# Patient Record
Sex: MALE | Race: WHITE | NOT HISPANIC OR LATINO | URBAN - METROPOLITAN AREA
[De-identification: names, ages, dates, MRNs, and addresses within clinical notes are randomized per-mention and may not be internally consistent; named-entity substitution may affect disease eponyms.]

---

## 2024-06-08 ENCOUNTER — OFFICE VISIT (OUTPATIENT)
Dept: URGENT CARE | Facility: CLINIC | Age: 19
End: 2024-06-08
Payer: COMMERCIAL

## 2024-06-08 VITALS
WEIGHT: 165 LBS | TEMPERATURE: 96.5 F | OXYGEN SATURATION: 98 % | SYSTOLIC BLOOD PRESSURE: 122 MMHG | DIASTOLIC BLOOD PRESSURE: 88 MMHG | HEART RATE: 82 BPM | BODY MASS INDEX: 23.62 KG/M2 | RESPIRATION RATE: 16 BRPM | HEIGHT: 70 IN

## 2024-06-08 DIAGNOSIS — J02.9 SORE THROAT: Primary | ICD-10-CM

## 2024-06-08 LAB
POC RAPID STREP: NEGATIVE
S PYO DNA THROAT QL NAA+PROBE: NOT DETECTED

## 2024-06-08 PROCEDURE — 87102 FUNGUS ISOLATION CULTURE: CPT

## 2024-06-08 PROCEDURE — 87880 STREP A ASSAY W/OPTIC: CPT | Performed by: PHYSICIAN ASSISTANT

## 2024-06-08 PROCEDURE — 1036F TOBACCO NON-USER: CPT | Performed by: PHYSICIAN ASSISTANT

## 2024-06-08 PROCEDURE — 87651 STREP A DNA AMP PROBE: CPT

## 2024-06-08 PROCEDURE — 99204 OFFICE O/P NEW MOD 45 MIN: CPT | Performed by: PHYSICIAN ASSISTANT

## 2024-06-08 RX ADMIN — Medication 10 MG: at 15:04

## 2024-06-08 ASSESSMENT — PAIN SCALES - GENERAL: PAINLEVEL: 5

## 2024-06-08 NOTE — LETTER
June 8, 2024     Patient: Marcos Mike   YOB: 2005   Date of Visit: 6/8/2024       To Whom It May Concern:    It is my medical opinion that Marcos Mike may return to work on 06/09/2024 .    If you have any questions or concerns, please don't hesitate to call.         Sincerely,        Jani Flowers PA-C    CC: No Recipients

## 2024-06-08 NOTE — PROGRESS NOTES
"Subjective   Patient ID: Marcos Mike is a 18 y.o. male who presents for Sore Throat (ST, bilateral ear pain, fever 101 since Monday 6/3).  Patient is here with complaints of sore throat bilateral ear pain ongoing for the last 5 days.  Notes fevers on days 1 and 2 but the fever has resolved.  He denies any nausea or vomiting diarrhea or abdominal pain no myalgias.  The sore throat has not improved at all despite use of ibuprofen.  He otherwise notes that he is generally healthy and at time of exam is afebrile and nontoxic in appearance.  He denies any significant nasal congestion denies any significant cough    No past medical history on file.      The remainder of the systems were reviewed and are negative unless noted above      Objective   /88   Pulse 82   Temp 35.8 °C (96.5 °F)   Resp 16   Ht 1.778 m (5' 10\")   Wt 74.8 kg (165 lb)   SpO2 98%   BMI 23.68 kg/m²   Physical Exam  Vitals reviewed.   Constitutional:       General: He is not in acute distress.     Appearance: Normal appearance. He is not toxic-appearing.   HENT:      Head: Normocephalic.      Right Ear: Tympanic membrane and ear canal normal. No tenderness. No mastoid tenderness.      Left Ear: Tympanic membrane and ear canal normal. No tenderness. No mastoid tenderness.      Nose: No congestion or rhinorrhea.      Mouth/Throat:      Mouth: Mucous membranes are moist.      Pharynx: Oropharynx is clear. Posterior oropharyngeal erythema present.      Comments: Thick white coating to the tongue.  Otherwise no evidence of peritonsillar abscess or tonsillar hypertrophy  Eyes:      Conjunctiva/sclera: Conjunctivae normal.   Cardiovascular:      Rate and Rhythm: Normal rate and regular rhythm.      Heart sounds: No murmur heard.  Pulmonary:      Effort: No respiratory distress.      Breath sounds: No stridor. No wheezing, rhonchi or rales.   Chest:      Chest wall: No tenderness.   Abdominal:      Tenderness: There is no abdominal tenderness. " There is no guarding.   Musculoskeletal:         General: Normal range of motion.      Cervical back: No rigidity.   Lymphadenopathy:      Cervical: Cervical adenopathy present.   Skin:     General: Skin is warm and dry.      Findings: No erythema.   Neurological:      General: No focal deficit present.      Mental Status: He is alert.         Assessment/Plan   Problem List Items Addressed This Visit       Sore throat - Primary    Relevant Medications    dexAMETHasone (Decadron) 10 mg/mL oral liquid 10 mg (Start on 6/8/2024  2:30 PM)    Other Relevant Orders    POCT rapid strep A manually resulted (Completed)    Group A Streptococcus, PCR    Fungal Culture/Smear   Rapid strep is negative.  Notable erythema of the posterior oropharynx and a thick white coating on the tongue.  Sending swab for fungal smear as well as strep PCR testing to rule strep out.  Patient treated with single dose of Decadron here in office.  Recommending continued use of ibuprofen at home.  Salt water gargles, sore throat lozenges

## 2024-06-11 LAB
FUNGUS SPEC CULT: ABNORMAL
FUNGUS SPEC FUNGUS STN: ABNORMAL

## 2025-04-21 ENCOUNTER — HOSPITAL ENCOUNTER (EMERGENCY)
Facility: HOSPITAL | Age: 20
Discharge: HOME | End: 2025-04-21
Payer: COMMERCIAL

## 2025-04-21 VITALS
TEMPERATURE: 98.6 F | OXYGEN SATURATION: 97 % | BODY MASS INDEX: 23.62 KG/M2 | HEIGHT: 70 IN | RESPIRATION RATE: 16 BRPM | WEIGHT: 165 LBS | HEART RATE: 84 BPM | SYSTOLIC BLOOD PRESSURE: 143 MMHG | DIASTOLIC BLOOD PRESSURE: 72 MMHG

## 2025-04-21 DIAGNOSIS — T25.122A SUPERFICIAL BURN OF LEFT FOOT, INITIAL ENCOUNTER: ICD-10-CM

## 2025-04-21 DIAGNOSIS — T25.222A PARTIAL THICKNESS BURN OF LEFT FOOT, INITIAL ENCOUNTER: Primary | ICD-10-CM

## 2025-04-21 PROCEDURE — 99281 EMR DPT VST MAYX REQ PHY/QHP: CPT

## 2025-04-21 ASSESSMENT — COLUMBIA-SUICIDE SEVERITY RATING SCALE - C-SSRS
2. HAVE YOU ACTUALLY HAD ANY THOUGHTS OF KILLING YOURSELF?: NO
1. IN THE PAST MONTH, HAVE YOU WISHED YOU WERE DEAD OR WISHED YOU COULD GO TO SLEEP AND NOT WAKE UP?: NO
6. HAVE YOU EVER DONE ANYTHING, STARTED TO DO ANYTHING, OR PREPARED TO DO ANYTHING TO END YOUR LIFE?: NO

## 2025-04-21 ASSESSMENT — PAIN SCALES - GENERAL: PAINLEVEL_OUTOF10: 7

## 2025-04-21 ASSESSMENT — PAIN - FUNCTIONAL ASSESSMENT: PAIN_FUNCTIONAL_ASSESSMENT: 0-10
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
